# Patient Record
Sex: MALE | Race: WHITE | NOT HISPANIC OR LATINO
[De-identification: names, ages, dates, MRNs, and addresses within clinical notes are randomized per-mention and may not be internally consistent; named-entity substitution may affect disease eponyms.]

---

## 2019-05-19 ENCOUNTER — TRANSCRIPTION ENCOUNTER (OUTPATIENT)
Age: 24
End: 2019-05-19

## 2019-05-19 ENCOUNTER — EMERGENCY (EMERGENCY)
Facility: HOSPITAL | Age: 24
LOS: 1 days | Discharge: HOME | End: 2019-05-19
Attending: EMERGENCY MEDICINE | Admitting: EMERGENCY MEDICINE
Payer: COMMERCIAL

## 2019-05-19 VITALS
OXYGEN SATURATION: 100 % | HEART RATE: 103 BPM | TEMPERATURE: 98 F | DIASTOLIC BLOOD PRESSURE: 111 MMHG | WEIGHT: 151.9 LBS | HEIGHT: 69 IN | SYSTOLIC BLOOD PRESSURE: 164 MMHG | RESPIRATION RATE: 14 BRPM

## 2019-05-19 VITALS
RESPIRATION RATE: 16 BRPM | DIASTOLIC BLOOD PRESSURE: 55 MMHG | HEART RATE: 74 BPM | SYSTOLIC BLOOD PRESSURE: 146 MMHG | OXYGEN SATURATION: 99 %

## 2019-05-19 PROCEDURE — 71046 X-RAY EXAM CHEST 2 VIEWS: CPT | Mod: 26

## 2019-05-19 PROCEDURE — 71046 X-RAY EXAM CHEST 2 VIEWS: CPT

## 2019-05-19 PROCEDURE — 99283 EMERGENCY DEPT VISIT LOW MDM: CPT | Mod: 25

## 2019-05-19 PROCEDURE — 93005 ELECTROCARDIOGRAM TRACING: CPT

## 2019-05-19 PROCEDURE — 99283 EMERGENCY DEPT VISIT LOW MDM: CPT

## 2019-05-19 PROCEDURE — 93010 ELECTROCARDIOGRAM REPORT: CPT

## 2019-05-19 RX ORDER — ACETAMINOPHEN 500 MG
650 TABLET ORAL ONCE
Refills: 0 | Status: COMPLETED | OUTPATIENT
Start: 2019-05-19 | End: 2019-05-19

## 2019-05-19 RX ADMIN — Medication 650 MILLIGRAM(S): at 20:30

## 2019-05-19 RX ADMIN — Medication 650 MILLIGRAM(S): at 19:28

## 2019-05-19 NOTE — ED ADULT TRIAGE NOTE - CHIEF COMPLAINT QUOTE
"All of a sudden I was sick and dizzy."  pt had sudden onset of nausea and dizziness with headache, found to be hypertensive by EMS

## 2019-05-19 NOTE — ED ADULT NURSE NOTE - OBJECTIVE STATEMENT
Patient received to room 8 via EMS.  He states he feels anxious.  Eyes open wide, pupils appear dilated, patient appears anxious.  He states he drank a little coffee and he vaped.  He denies taking any other medications.

## 2019-05-19 NOTE — ED PROVIDER NOTE - PROGRESS NOTE DETAILS
pt is currently comfortable in ED, in no distress, feels better with tx in ED, pt was advised to FU with PCP for repeat Bp.  Discussed with patient results of work up, strict return precautions, and need for follow up.  Patient expressed understanding and agrees with plan.  Patient informed to return to the ER immediately for any problems, concerns, or recurrent/worsening symptoms.

## 2019-05-19 NOTE — ED PROVIDER NOTE - OBJECTIVE STATEMENT
24 y/o male, comes in with dad c/o dizziness, elevated Bp and chest discomfort which occurred today.  pt states he went to urgent care to be tx for STD and was told that his Bp was elevated, states then his felt dizzy and chest discomfort.  pt denies any nausea or vomiting, states he has a hx of anxiety.  denies any fevers or chills, denies any sick contacts or recent travel, denies any family hx of cardiac disease, denies any drug usage.  denies any recent travel, sick contacts or long car ride.

## 2019-05-19 NOTE — ED PROVIDER NOTE - ATTENDING CONTRIBUTION TO CARE
I have personally performed a face to face diagnostic evaluation on this patient.  I have reviewed the PA note and agree with the history, exam, and plan of care, except as noted.  History and Exam by me shows  not feeling good today,  dizzy, cp, nausea, similar to prior anxiety attack,  but no vomiting .  No more dizzy in the ED.   cp started around 0900 am, constant.  chest - nt, s1,s2, rrr, no murmur. lungs cta bl.  ekg normal.  cxr neg.

## 2020-12-02 ENCOUNTER — APPOINTMENT (OUTPATIENT)
Dept: HUMAN REPRODUCTION | Facility: CLINIC | Age: 25
End: 2020-12-02
Payer: COMMERCIAL

## 2020-12-02 PROCEDURE — 89322 SEMEN ANAL STRICT CRITERIA: CPT

## 2020-12-02 PROCEDURE — 99072 ADDL SUPL MATRL&STAF TM PHE: CPT

## 2024-05-16 NOTE — ED PROVIDER NOTE - TOBACCO USE
